# Patient Record
Sex: FEMALE | Race: BLACK OR AFRICAN AMERICAN | NOT HISPANIC OR LATINO | Employment: UNEMPLOYED | URBAN - METROPOLITAN AREA
[De-identification: names, ages, dates, MRNs, and addresses within clinical notes are randomized per-mention and may not be internally consistent; named-entity substitution may affect disease eponyms.]

---

## 2017-09-01 ENCOUNTER — OFFICE VISIT (OUTPATIENT)
Dept: OBSTETRICS AND GYNECOLOGY | Facility: CLINIC | Age: 22
End: 2017-09-01
Payer: MEDICAID

## 2017-09-01 VITALS
HEIGHT: 63 IN | SYSTOLIC BLOOD PRESSURE: 110 MMHG | WEIGHT: 149.94 LBS | DIASTOLIC BLOOD PRESSURE: 70 MMHG | BODY MASS INDEX: 26.57 KG/M2

## 2017-09-01 DIAGNOSIS — Z01.419 WELL WOMAN EXAM WITH ROUTINE GYNECOLOGICAL EXAM: Primary | ICD-10-CM

## 2017-09-01 DIAGNOSIS — Z30.41 ENCOUNTER FOR SURVEILLANCE OF CONTRACEPTIVE PILLS: ICD-10-CM

## 2017-09-01 PROCEDURE — 99212 OFFICE O/P EST SF 10 MIN: CPT | Mod: PBBFAC | Performed by: OBSTETRICS & GYNECOLOGY

## 2017-09-01 PROCEDURE — 99395 PREV VISIT EST AGE 18-39: CPT | Mod: S$PBB,,, | Performed by: OBSTETRICS & GYNECOLOGY

## 2017-09-01 PROCEDURE — 99999 PR PBB SHADOW E&M-EST. PATIENT-LVL II: CPT | Mod: PBBFAC,,, | Performed by: OBSTETRICS & GYNECOLOGY

## 2017-09-01 RX ORDER — NORETHINDRONE ACETATE AND ETHINYL ESTRADIOL 1MG-20(21)
1 KIT ORAL DAILY
Qty: 30 TABLET | Refills: 11 | Status: SHIPPED | OUTPATIENT
Start: 2017-09-01 | End: 2018-09-14 | Stop reason: SDUPTHER

## 2017-09-01 NOTE — PROGRESS NOTES
History & Physical  Gynecology      SUBJECTIVE:     Chief Complaint: Well Woman       History of Present Illness:  Annual Exam-Premenopausal  Patient presents for annual exam. The patient has no complaints today. Menstrual cycles are once a month and regular with OCP.  The patient has never been sexually active. GYN screening history: no prior history of gyn screening tests. The patient participates in regular exercise: yes.  The patient does not smoke.    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  She only occasionally drinks, does not smoke, and does not do drugs.  She feels safe in her environment.  She does not feel pressured by anyone to be sexually active.  She states that she had waxing done recently and her  said that her vaginal area was discolored.     Contraception: OCP; not sexually active     FH:  Breast cancer: grandmother, diagnosed age 59  Colon cancer: none  Ovarian cancer: none    Review of patient's allergies indicates:  No Known Allergies    History reviewed. No pertinent past medical history.  History reviewed. No pertinent surgical history.  OB History      Para Term  AB Living    0 0 0 0 0 0    SAB TAB Ectopic Multiple Live Births    0 0 0 0          Family History   Problem Relation Age of Onset    Breast cancer Maternal Grandmother     Ovarian cancer Neg Hx     Colon cancer Neg Hx      Social History   Substance Use Topics    Smoking status: Never Smoker    Smokeless tobacco: Never Used    Alcohol use No       Current Outpatient Prescriptions   Medication Sig    norethindrone-ethinyl estradiol (JUNEL FE 1/20) 1 mg-20 mcg (21)/75 mg (7) per tablet Take 1 tablet by mouth once daily.     No current facility-administered medications for this visit.          Review of Systems:  Review of Systems   Constitutional: Negative for activity change, appetite change and fever.   Respiratory: Negative for shortness of breath.    Cardiovascular: Negative for  chest pain.   Gastrointestinal: Negative for abdominal pain, constipation, diarrhea, nausea and vomiting.   Genitourinary: Negative for menorrhagia, menstrual problem, pelvic pain, vaginal bleeding, vaginal discharge and vaginal pain.   Neurological: Negative for numbness and headaches.   Breast: Negative for breast pain and nipple discharge       OBJECTIVE:     Physical Exam:  Physical Exam   Constitutional: She is oriented to person, place, and time. She appears well-developed and well-nourished.   Cardiovascular: Normal rate, regular rhythm and normal heart sounds.    Pulmonary/Chest: Effort normal and breath sounds normal. No respiratory distress. She has no wheezes. She has no rales.   Genitourinary: No labial fusion. There is no rash, tenderness, lesion or injury on the right labia. There is no rash, tenderness, lesion or injury on the left labia.   Neurological: She is oriented to person, place, and time.   Skin: No pallor.   Psychiatric: She has a normal mood and affect. Her behavior is normal. Judgment and thought content normal.   Nursing note and vitals reviewed.        ASSESSMENT:       ICD-10-CM ICD-9-CM    1. Well woman exam with routine gynecological exam Z01.419 V72.31           Plan:      [facility] was seen today for well woman.    Diagnoses and all orders for this visit:    Well woman exam with routine gynecological exam        No orders of the defined types were placed in this encounter.      Well Woman:  - Pap smear not required  - Birth control: refilled  - GC/CT:not required  - Mammogram: due age 42  - Smoking cessation: n/a  - Labs: none required   - Vaccines: HPV completed and up to date on vaccines  - discussed safe sex practices; discussed alcohol and drug usage    Return in about 1 year (around 9/1/2018) for annual or prn.    Faye Silverman

## 2018-01-21 DIAGNOSIS — Z30.41 ENCOUNTER FOR SURVEILLANCE OF CONTRACEPTIVE PILLS: ICD-10-CM

## 2018-01-22 RX ORDER — NORETHINDRONE ACETATE AND ETHINYL ESTRADIOL AND FERROUS FUMARATE 1MG-20(21)
KIT ORAL
Qty: 28 TABLET | Refills: 0 | OUTPATIENT
Start: 2018-01-22

## 2018-09-14 ENCOUNTER — OFFICE VISIT (OUTPATIENT)
Dept: OBSTETRICS AND GYNECOLOGY | Facility: CLINIC | Age: 23
End: 2018-09-14
Payer: MEDICAID

## 2018-09-14 VITALS — HEIGHT: 63 IN | BODY MASS INDEX: 26.4 KG/M2 | WEIGHT: 149 LBS

## 2018-09-14 DIAGNOSIS — Z01.419 WELL WOMAN EXAM WITH ROUTINE GYNECOLOGICAL EXAM: Primary | ICD-10-CM

## 2018-09-14 DIAGNOSIS — Z30.41 ENCOUNTER FOR SURVEILLANCE OF CONTRACEPTIVE PILLS: ICD-10-CM

## 2018-09-14 PROCEDURE — 99213 OFFICE O/P EST LOW 20 MIN: CPT | Mod: PBBFAC | Performed by: OBSTETRICS & GYNECOLOGY

## 2018-09-14 PROCEDURE — 99999 PR PBB SHADOW E&M-EST. PATIENT-LVL III: CPT | Mod: PBBFAC,,, | Performed by: OBSTETRICS & GYNECOLOGY

## 2018-09-14 PROCEDURE — 88175 CYTOPATH C/V AUTO FLUID REDO: CPT

## 2018-09-14 PROCEDURE — 99395 PREV VISIT EST AGE 18-39: CPT | Mod: S$PBB,,, | Performed by: OBSTETRICS & GYNECOLOGY

## 2018-09-14 RX ORDER — NORETHINDRONE ACETATE AND ETHINYL ESTRADIOL 1MG-20(21)
1 KIT ORAL DAILY
Qty: 84 TABLET | Refills: 4 | Status: SHIPPED | OUTPATIENT
Start: 2018-09-14 | End: 2019-08-22 | Stop reason: SDUPTHER

## 2018-12-28 ENCOUNTER — HOSPITAL ENCOUNTER (EMERGENCY)
Facility: HOSPITAL | Age: 23
Discharge: HOME | End: 2018-12-28
Attending: EMERGENCY MEDICINE
Payer: MEDICAID

## 2018-12-28 VITALS
BODY MASS INDEX: 26.4 KG/M2 | TEMPERATURE: 98 F | WEIGHT: 149 LBS | OXYGEN SATURATION: 99 % | RESPIRATION RATE: 20 BRPM | SYSTOLIC BLOOD PRESSURE: 114 MMHG | HEART RATE: 65 BPM | HEIGHT: 63 IN | DIASTOLIC BLOOD PRESSURE: 72 MMHG

## 2018-12-28 DIAGNOSIS — J40 BRONCHITIS: Primary | ICD-10-CM

## 2018-12-28 LAB
B-HCG UR QL: NEGATIVE
CTP QC/QA: YES
CTP QC/QA: YES
S PYO RRNA THROAT QL PROBE: NEGATIVE

## 2018-12-28 PROCEDURE — 87081 CULTURE SCREEN ONLY: CPT

## 2018-12-28 PROCEDURE — 81025 URINE PREGNANCY TEST: CPT | Performed by: EMERGENCY MEDICINE

## 2018-12-28 PROCEDURE — 99284 EMERGENCY DEPT VISIT MOD MDM: CPT

## 2018-12-28 RX ORDER — MONTELUKAST SODIUM 10 MG/1
10 TABLET ORAL NIGHTLY
Qty: 30 TABLET | Refills: 0 | Status: SHIPPED | OUTPATIENT
Start: 2018-12-28 | End: 2019-01-27

## 2018-12-28 RX ORDER — BENZONATATE 100 MG/1
100 CAPSULE ORAL 3 TIMES DAILY PRN
Qty: 20 CAPSULE | Refills: 0 | Status: SHIPPED | OUTPATIENT
Start: 2018-12-28 | End: 2019-01-07

## 2018-12-28 RX ORDER — HYDROCODONE POLISTIREX AND CHLORPHENIRAMINE POLISTIREX 10; 8 MG/5ML; MG/5ML
5 SUSPENSION, EXTENDED RELEASE ORAL NIGHTLY PRN
Qty: 60 ML | Refills: 0 | Status: SHIPPED | OUTPATIENT
Start: 2018-12-28 | End: 2019-08-22

## 2018-12-28 RX ORDER — LORATADINE 10 MG/1
10 TABLET ORAL DAILY
Qty: 60 TABLET | Refills: 0 | COMMUNITY
Start: 2018-12-28 | End: 2019-08-22

## 2018-12-28 RX ORDER — FLUTICASONE PROPIONATE 50 MCG
2 SPRAY, SUSPENSION (ML) NASAL DAILY
Qty: 16 G | Refills: 0 | Status: SHIPPED | OUTPATIENT
Start: 2018-12-28 | End: 2019-08-22

## 2018-12-29 NOTE — ED PROVIDER NOTES
Encounter Date: 12/28/2018    SCRIBE #1 NOTE: I, Codi Lindsay, am scribing for, and in the presence of,  Dr. Carty. I have scribed the following portions of the note - Other sections scribed: HPI, ROS, PE.       History     Chief Complaint   Patient presents with    Cough     pt reports a productive cough with clear, thick mucus x 1 1/2 weeks. Denies fever at home. Reports sore throat as well.     23 y.o female presents with a productive cough with clear mucus for 1.5 weeks. She states having a cold recently (sore throat and congestion) but hose symptoms have since resolved. She denies fever and chills, but notes rhinorrhea. No ill contacts. No hx of lung problems.       The history is provided by the patient.     Review of patient's allergies indicates:  No Known Allergies  History reviewed. No pertinent past medical history.  History reviewed. No pertinent surgical history.  Family History   Problem Relation Age of Onset    Breast cancer Maternal Grandmother 50    Ovarian cancer Neg Hx     Colon cancer Neg Hx      Social History     Tobacco Use    Smoking status: Never Smoker    Smokeless tobacco: Never Used   Substance Use Topics    Alcohol use: No    Drug use: No     Review of Systems   Constitutional: Negative for chills and fever.   HENT: Positive for rhinorrhea. Negative for congestion and sore throat.    Respiratory: Positive for cough (productive). Negative for shortness of breath.    All other systems reviewed and are negative.      Physical Exam     Initial Vitals [12/28/18 1911]   BP Pulse Resp Temp SpO2   110/60 72 16 97.7 °F (36.5 °C) 97 %      MAP       --         Physical Exam    Nursing note and vitals reviewed.  Constitutional: She appears well-developed and well-nourished. She is not diaphoretic. No distress.   HENT:   Head: Normocephalic and atraumatic.   Mouth/Throat: Uvula is midline, oropharynx is clear and moist and mucous membranes are normal. No oropharyngeal exudate, posterior  oropharyngeal edema, posterior oropharyngeal erythema or tonsillar abscesses.   Eyes: EOM are normal.   Neck: Normal range of motion.   Cardiovascular: Normal rate, regular rhythm and normal heart sounds. Exam reveals no gallop and no friction rub.    No murmur heard.  Pulmonary/Chest: Breath sounds normal. No respiratory distress. She has no wheezes. She has no rhonchi. She has no rales.   Musculoskeletal: Normal range of motion.   Neurological: She is alert and oriented to person, place, and time. No cranial nerve deficit or sensory deficit.   Skin: Skin is warm and dry. Capillary refill takes less than 2 seconds.   Psychiatric: She has a normal mood and affect. Her behavior is normal.         ED Course   Procedures  Labs Reviewed   CULTURE, STREP A,  THROAT   POCT URINE PREGNANCY   POCT RAPID STREP A          Imaging Results    None          Medical Decision Making:   Clinical Tests:   Lab Tests: Ordered            Scribe Attestation:   Scribe #1: I performed the above scribed service and the documentation accurately describes the services I performed. I attest to the accuracy of the note.      Labs Reviewed  Admission on 12/28/2018, Discharged on 12/28/2018   Component Date Value Ref Range Status    POC Preg Test, Ur 12/28/2018 Negative  Negative Final     Acceptable 12/28/2018 Yes   Final    Rapid Strep A Screen 12/28/2018 Negative  Negative Final     Acceptable 12/28/2018 Yes   Final        Imaging Reviewed    Imaging Results    None         Medications given in ED    Medications - No data to display    This document was produced by a scribe under my direction and in my presence. I agree with the content of the note and have made any necessary edits.     Ly Carty MD         Note was created using voice recognition software. Note may have occasional typographical errors that may not have been identified and edited despite good bisi initial review prior to signing.      Discharge Medications        Medication List      START taking these medications    benzonatate 100 MG capsule  Commonly known as:  TESSALON  Take 1 capsule (100 mg total) by mouth 3 (three) times daily as needed for Cough.     fluticasone 50 mcg/actuation nasal spray  Commonly known as:  FLONASE  2 sprays (100 mcg total) by Each Nare route once daily.     hydrocodone-chlorpheniramine 10-8 mg/5 mL suspension  Commonly known as:  TUSSIONEX  Take 5 mLs by mouth nightly as needed for Cough or Congestion.     loratadine 10 mg tablet  Commonly known as:  CLARITIN  Take 1 tablet (10 mg total) by mouth once daily.     montelukast 10 mg tablet  Commonly known as:  SINGULAIR  Take 1 tablet (10 mg total) by mouth every evening.        ASK your doctor about these medications    norethindrone-ethinyl estradiol 1 mg-20 mcg (21)/75 mg (7) per tablet  Commonly known as:  JUNEL FE 1/20  Take 1 tablet by mouth once daily.           Where to Get Your Medications      These medications were sent to [facility] [address] [place] [address] AT [address] [address]    Phone:  [phone]   · benzonatate 100 MG capsule  · fluticasone 50 mcg/actuation nasal spray  · montelukast 10 mg tablet     You can get these medications from any pharmacy    Bring a paper prescription for each of these medications  · hydrocodone-chlorpheniramine 10-8 mg/5 mL suspension  You don't need a prescription for these medications  · loratadine 10 mg tablet               Patient discharged to home in stable condition with instructions to:   1. Please take all meds as prescribed.  2. Follow-up with your primary care doctor   3. Return precautions discussed and patient and/or family/caretaker understands to return to the emergency room for any concerns including worsening of your current symptoms, fever, chills, night sweats, worsening pain, chest pain, shortness of breath, nausea, vomiting,  diarrhea, bleeding, headache, difficulty talking, visual disturbances, weakness, numbness or any other acute concerns             Clinical Impression:     1. Bronchitis                                 Ly Carty MD  12/29/18 8413

## 2018-12-30 LAB — BACTERIA THROAT CULT: NORMAL

## 2019-07-24 ENCOUNTER — TELEPHONE (OUTPATIENT)
Dept: OBSTETRICS AND GYNECOLOGY | Facility: CLINIC | Age: 24
End: 2019-07-24

## 2019-08-22 ENCOUNTER — OFFICE VISIT (OUTPATIENT)
Dept: OBSTETRICS AND GYNECOLOGY | Facility: CLINIC | Age: 24
End: 2019-08-22
Payer: MEDICAID

## 2019-08-22 VITALS
WEIGHT: 173.06 LBS | BODY MASS INDEX: 30.66 KG/M2 | HEIGHT: 63 IN | DIASTOLIC BLOOD PRESSURE: 58 MMHG | SYSTOLIC BLOOD PRESSURE: 100 MMHG

## 2019-08-22 DIAGNOSIS — Z01.419 WELL WOMAN EXAM WITH ROUTINE GYNECOLOGICAL EXAM: Primary | ICD-10-CM

## 2019-08-22 DIAGNOSIS — Z30.41 ENCOUNTER FOR SURVEILLANCE OF CONTRACEPTIVE PILLS: ICD-10-CM

## 2019-08-22 DIAGNOSIS — N32.89 BLADDER SPASMS: ICD-10-CM

## 2019-08-22 PROCEDURE — 99999 PR PBB SHADOW E&M-EST. PATIENT-LVL III: ICD-10-PCS | Mod: PBBFAC,,, | Performed by: OBSTETRICS & GYNECOLOGY

## 2019-08-22 PROCEDURE — 87086 URINE CULTURE/COLONY COUNT: CPT

## 2019-08-22 PROCEDURE — 99999 PR PBB SHADOW E&M-EST. PATIENT-LVL III: CPT | Mod: PBBFAC,,, | Performed by: OBSTETRICS & GYNECOLOGY

## 2019-08-22 PROCEDURE — 99395 PREV VISIT EST AGE 18-39: CPT | Mod: S$PBB,,, | Performed by: OBSTETRICS & GYNECOLOGY

## 2019-08-22 PROCEDURE — 99395 PR PREVENTIVE VISIT,EST,18-39: ICD-10-PCS | Mod: S$PBB,,, | Performed by: OBSTETRICS & GYNECOLOGY

## 2019-08-22 PROCEDURE — 99213 OFFICE O/P EST LOW 20 MIN: CPT | Mod: PBBFAC | Performed by: OBSTETRICS & GYNECOLOGY

## 2019-08-22 RX ORDER — NORETHINDRONE ACETATE AND ETHINYL ESTRADIOL 1MG-20(21)
1 KIT ORAL DAILY
Qty: 84 TABLET | Refills: 4 | Status: SHIPPED | OUTPATIENT
Start: 2019-08-22 | End: 2020-08-17 | Stop reason: SDUPTHER

## 2019-08-22 NOTE — PROGRESS NOTES
Subjective:       Patient ID: Mabel Tabor is a 24 y.o. female.    Chief Complaint:  Annual Exam      History of Present Illness:  Gynecologic Exam   The patient's pertinent negatives include no genital itching, genital lesions, genital odor, genital rash, missed menses, pelvic pain, vaginal bleeding or vaginal discharge. She is not pregnant. Associated symptoms include urgency. Pertinent negatives include no abdominal pain, anorexia, back pain, chills, constipation, diarrhea, discolored urine, dysuria, fever, flank pain, frequency, headaches, hematuria, nausea, painful intercourse, rash or vomiting. She has not been passing clots. She has not been passing tissue. She is not sexually active. No, her partner does not have an STD. She uses oral contraceptives for contraception. Her menstrual history has been regular. There is no history of an abdominal surgery, a  section, an ectopic pregnancy, endometriosis, a gynecological surgery, herpes simplex, menorrhagia, metrorrhagia, miscarriage, ovarian cysts, perineal abscess, PID, an STD, a terminated pregnancy or vaginosis.     SUBJECTIVE:   24 y.o. female  here for annual.     She describes her periods as regular, lasting 3-5 days. normal flow.  denies break through bleeding.   denies vaginal itching or irritation.  denies vaginal discharge.    She is not sexually active.  She uses oral contraceptives (estrogen/progesterone) for contraception.    History of abnormal pap: N/A  Last Pap: N/A  Last MMG: N/A  Last Colonoscopy: N/A    FH: [place] breast cancer  in late 50s. Denies other family history of breast, GYN or colon cancer.    GYN & OB History  Patient's last menstrual period was 2019.   Date of Last Pap: 2018    OB History    Para Term  AB Living   0 0 0 0 0 0   SAB TAB Ectopic Multiple Live Births   0 0 0 0         Review of Systems  Review of Systems   Constitutional: Negative for chills, diaphoresis, fatigue and fever.    Respiratory: Negative for cough and shortness of breath.    Cardiovascular: Negative for chest pain and palpitations.   Gastrointestinal: Negative for abdominal pain, anorexia, constipation, diarrhea, nausea and vomiting.   Genitourinary: Positive for bladder incontinence and urgency. Negative for dysmenorrhea, dyspareunia, dysuria, flank pain, frequency, hematuria, menorrhagia, menstrual problem, missed menses, pelvic pain, vaginal bleeding, vaginal discharge and vaginal pain.   Musculoskeletal: Negative for back pain and myalgias.   Integumentary:  Negative for rash, acne, breast mass, nipple discharge and breast skin changes.   Neurological: Negative for headaches.   Psychiatric/Behavioral: Negative for depression. The patient is not nervous/anxious.    Breast: Negative for mass, mastodynia, nipple discharge and skin changes          Objective:    Physical Exam:   Constitutional: She is oriented to person, place, and time. She appears well-developed and well-nourished. No distress.    HENT:   Head: Normocephalic and atraumatic.    Eyes: EOM are normal.    Neck: Normal range of motion. No thyromegaly present.     Pulmonary/Chest: Effort normal. She exhibits no mass and no tenderness. Right breast exhibits no inverted nipple, no mass, no nipple discharge, no skin change, no tenderness and no swelling. Left breast exhibits no inverted nipple, no mass, no nipple discharge, no skin change, no tenderness and no swelling. Breasts are symmetrical.        Abdominal: Soft. She exhibits no distension and no mass. There is no tenderness. There is no rebound and no guarding. No hernia.     Genitourinary:   Genitourinary Comments: Normal external female genitalia; vagina rugated, normal; cervix normal, no masses; uterus small mobile nontender; no adnexal masses palpated.           Musculoskeletal: Normal range of motion and moves all extremeties.       Neurological: She is alert and oriented to person, place, and time.     Skin: Skin is warm. No rash noted.    Psychiatric: She has a normal mood and affect. Her behavior is normal. Judgment and thought content normal.          Assessment:        1. Well woman exam with routine gynecological exam    2. Bladder spasms    3. Encounter for surveillance of contraceptive pills             Plan:      Mabel was seen today for well woman.    Diagnoses and all orders for this visit:    Well woman exam with routine gynecological exam  - Pap guidelines discussed. Pap up to date.   - MMG age 42.   - Contraception - OCP Rx refilled  - STD screening - declined.    -     Liquid-based pap smear, screening    Bladder spasms  - Urine dip neg. Urine culture collected.   - Recommended emptying bladder frequently.  - Discussed dietary modifications - list of bladder irritants given.   - F/U for further workup if no improvement.     Encounter for surveillance of contraceptive pills  -     norethindrone-ethinyl estradiol (JUNEL FE 1/20) 1 mg-20 mcg (21)/75 mg (7) per tablet; Take 1 tablet by mouth once daily.      Orders Placed This Encounter   Procedures    Urine culture       Follow up in about 1 year (around 8/22/2020) for annual.

## 2019-08-22 NOTE — PATIENT INSTRUCTIONS
List of Common Bladder Irritants*  Alcoholic beverages  Apples and apple juice  Cantaloupe  Carbonated beverages  Chili and spicy foods  Chocolate  Citrus fruit  Coffee (including decaffeinated)  Cranberries and cranberry juice  Grapes  Guava  Milk Products: milk, cheese, cottage cheese, yogurt, ice cream  Peaches  Pineapple  Plums  Strawberries  Sugar especially artificial sweeteners, saccharin, aspartame, corn sweeteners, honey, fructose, sucrose, lactose  Tea  Tomatoes and tomato juice  Vitamin B complex  Vinegar

## 2019-08-23 LAB
BACTERIA UR CULT: NORMAL
BACTERIA UR CULT: NORMAL

## 2019-10-06 ENCOUNTER — HOSPITAL ENCOUNTER (EMERGENCY)
Facility: HOSPITAL | Age: 24
Discharge: HOME | End: 2019-10-06
Attending: EMERGENCY MEDICINE
Payer: MEDICAID

## 2019-10-06 VITALS
BODY MASS INDEX: 30.12 KG/M2 | OXYGEN SATURATION: 98 % | TEMPERATURE: 98 F | SYSTOLIC BLOOD PRESSURE: 118 MMHG | RESPIRATION RATE: 18 BRPM | WEIGHT: 170 LBS | HEIGHT: 63 IN | HEART RATE: 80 BPM | DIASTOLIC BLOOD PRESSURE: 77 MMHG

## 2019-10-06 DIAGNOSIS — J02.9 PHARYNGITIS, UNSPECIFIED ETIOLOGY: Primary | ICD-10-CM

## 2019-10-06 LAB
B-HCG UR QL: NEGATIVE
CTP QC/QA: YES
CTP QC/QA: YES
S PYO RRNA THROAT QL PROBE: NEGATIVE

## 2019-10-06 PROCEDURE — 99284 EMERGENCY DEPT VISIT MOD MDM: CPT | Mod: 25,ER

## 2019-10-06 PROCEDURE — 81025 URINE PREGNANCY TEST: CPT | Mod: ER | Performed by: EMERGENCY MEDICINE

## 2019-10-06 PROCEDURE — 87880 STREP A ASSAY W/OPTIC: CPT | Mod: ER

## 2019-10-06 PROCEDURE — 25000003 PHARM REV CODE 250: Mod: ER | Performed by: EMERGENCY MEDICINE

## 2019-10-06 PROCEDURE — 63600175 PHARM REV CODE 636 W HCPCS: Mod: ER | Performed by: EMERGENCY MEDICINE

## 2019-10-06 PROCEDURE — 87081 CULTURE SCREEN ONLY: CPT

## 2019-10-06 RX ORDER — CETIRIZINE HYDROCHLORIDE, PSEUDOEPHEDRINE HYDROCHLORIDE 5; 120 MG/1; MG/1
1 TABLET, FILM COATED, EXTENDED RELEASE ORAL DAILY
Qty: 5 TABLET | Refills: 0 | Status: SHIPPED | OUTPATIENT
Start: 2019-10-06 | End: 2019-10-11

## 2019-10-06 RX ORDER — FLUTICASONE PROPIONATE 50 MCG
2 SPRAY, SUSPENSION (ML) NASAL DAILY
Qty: 15 G | Refills: 0 | Status: SHIPPED | OUTPATIENT
Start: 2019-10-06

## 2019-10-06 RX ORDER — PREDNISONE 20 MG/1
60 TABLET ORAL
Status: COMPLETED | OUTPATIENT
Start: 2019-10-06 | End: 2019-10-06

## 2019-10-06 RX ORDER — IBUPROFEN 600 MG/1
600 TABLET ORAL
Status: COMPLETED | OUTPATIENT
Start: 2019-10-06 | End: 2019-10-06

## 2019-10-06 RX ORDER — IBUPROFEN 600 MG/1
600 TABLET ORAL EVERY 6 HOURS PRN
Qty: 20 TABLET | Refills: 0 | Status: SHIPPED | OUTPATIENT
Start: 2019-10-06

## 2019-10-06 RX ADMIN — IBUPROFEN 600 MG: 600 TABLET, FILM COATED ORAL at 09:10

## 2019-10-06 RX ADMIN — PREDNISONE 60 MG: 20 TABLET ORAL at 09:10

## 2019-10-06 NOTE — ED PROVIDER NOTES
Encounter Date: 10/6/2019    SCRIBE #1 NOTE: I, Sohail Rider, am scribing for, and in the presence of,  Dr. Wing. I have scribed the following portions of the note - Other sections scribed: HPI, ROS, PE.       History     Chief Complaint   Patient presents with    Sore Throat     pt c/o sore throat for 2 days states painful swallowing.     24 year old female complaining of sore throat x 2 days. Pain (8/10)can be described as stingy and worsens with swallowing. Reports productive cough with white sputum and rhinorrhea with clear nasal discharge. Denies SOB, chest pain, fever, nausea, vomiting or abdominal pain. Denies relief with Tylenol. Patient has had this issue before around this time of the year last year.       The history is provided by the patient. No  was used.     Review of patient's allergies indicates:  No Known Allergies  History reviewed. No pertinent past medical history.  History reviewed. No pertinent surgical history.  Family History   Problem Relation Age of Onset    Breast cancer Maternal Grandmother 50    Ovarian cancer Neg Hx     Colon cancer Neg Hx      Social History     Tobacco Use    Smoking status: Never Smoker    Smokeless tobacco: Never Used   Substance Use Topics    Alcohol use: No    Drug use: No     Review of Systems   Constitutional: Negative for fever.   HENT: Positive for rhinorrhea and sore throat. Negative for ear discharge and ear pain.    Respiratory: Positive for cough. Negative for shortness of breath.    Cardiovascular: Negative for chest pain.   Gastrointestinal: Negative for abdominal pain, diarrhea, nausea and vomiting.   Genitourinary: Negative for difficulty urinating and dysuria.       Physical Exam     Initial Vitals [10/06/19 0902]   BP Pulse Resp Temp SpO2   118/77 80 18 98 °F (36.7 °C) 98 %      MAP       --         Physical Exam    Nursing note and vitals reviewed.  Constitutional: She appears well-developed and  well-nourished.   HENT:   Head: Normocephalic and atraumatic.   Right Ear: Tympanic membrane and external ear normal.   Left Ear: Tympanic membrane and external ear normal.   Nose: Nose normal.   Mouth/Throat: Oropharynx is clear and moist and mucous membranes are normal. No oropharyngeal exudate or posterior oropharyngeal edema.   Eyes: Conjunctivae are normal.   Neck: Neck supple.   Cardiovascular: Normal rate and regular rhythm. Exam reveals no gallop and no friction rub.    No murmur heard.  Pulmonary/Chest: Breath sounds normal. No respiratory distress. She has no wheezes. She has no rhonchi. She has no rales.   Lymphadenopathy:     She has no cervical adenopathy.   Neurological: She is alert and oriented to person, place, and time.   Skin: Skin is warm and dry.   Psychiatric: She has a normal mood and affect.         ED Course   Procedures  Labs Reviewed   CULTURE, STREP A,  THROAT   POCT URINE PREGNANCY   POCT RAPID STREP A          Imaging Results    None          Medical Decision Making:   History:   Old Medical Records: I decided to obtain old medical records.  Initial Assessment:   24 year old female with sore throat x 2 days. Reports productive cough and rhinorrhea, but denies fever. Physical exam is not significant for any pertinent findings  Clinical Tests:   Lab Tests: Ordered and Reviewed  The following lab test(s) were unremarkable: UPT  ED Management:  Will order POCT UPT and POCT rapid strep A  Will treat with predniSONE tablet 60 mg and ibuprofen 600 mg.  Will discharge patient home with ibuprofen, Zyrtec D and Flonase.  Strep was negative              Scribe Attestation:   Scribe #1: I performed the above scribed service and the documentation accurately describes the services I performed. I attest to the accuracy of the note.       I, Dr. Rachel Wing, personally performed the services described in this documentation. All medical record entries made by the scribe were at my direction and in my  presence.  I have reviewed the chart and agree that the record reflects my personal performance and is accurate and complete. Rachel Wing MD.  10:04 AM 10/06/2019             Clinical Impression:     1. Pharyngitis, unspecified etiology                                   Rachel Wing MD  10/06/19 1004

## 2019-10-08 LAB — BACTERIA THROAT CULT: NORMAL

## 2019-11-08 DIAGNOSIS — Z30.41 ENCOUNTER FOR SURVEILLANCE OF CONTRACEPTIVE PILLS: ICD-10-CM

## 2019-11-08 RX ORDER — NORETHINDRONE ACETATE AND ETHINYL ESTRADIOL AND FERROUS FUMARATE 1MG-20(21)
KIT ORAL
Qty: 84 TABLET | Refills: 0 | Status: SHIPPED | OUTPATIENT
Start: 2019-11-08 | End: 2020-01-29

## 2019-11-09 ENCOUNTER — HOSPITAL ENCOUNTER (EMERGENCY)
Facility: HOSPITAL | Age: 24
Discharge: HOME | End: 2019-11-09
Attending: EMERGENCY MEDICINE
Payer: MEDICAID

## 2019-11-09 VITALS
RESPIRATION RATE: 20 BRPM | OXYGEN SATURATION: 98 % | DIASTOLIC BLOOD PRESSURE: 56 MMHG | TEMPERATURE: 99 F | HEART RATE: 80 BPM | SYSTOLIC BLOOD PRESSURE: 112 MMHG

## 2019-11-09 DIAGNOSIS — J06.9 VIRAL URI: Primary | ICD-10-CM

## 2019-11-09 LAB
CTP QC/QA: YES
POC MOLECULAR INFLUENZA A AGN: NEGATIVE
POC MOLECULAR INFLUENZA B AGN: NEGATIVE

## 2019-11-09 PROCEDURE — 99283 PR EMERGENCY DEPT VISIT,LEVEL III: ICD-10-PCS | Mod: ,,, | Performed by: PHYSICIAN ASSISTANT

## 2019-11-09 PROCEDURE — 25000003 PHARM REV CODE 250: Performed by: PHYSICIAN ASSISTANT

## 2019-11-09 PROCEDURE — 99283 EMERGENCY DEPT VISIT LOW MDM: CPT | Mod: ,,, | Performed by: PHYSICIAN ASSISTANT

## 2019-11-09 PROCEDURE — 99283 EMERGENCY DEPT VISIT LOW MDM: CPT

## 2019-11-09 PROCEDURE — 87502 INFLUENZA DNA AMP PROBE: CPT

## 2019-11-09 RX ORDER — NAPROXEN 500 MG/1
500 TABLET ORAL
Status: COMPLETED | OUTPATIENT
Start: 2019-11-09 | End: 2019-11-09

## 2019-11-09 RX ORDER — BENZONATATE 100 MG/1
100 CAPSULE ORAL 3 TIMES DAILY PRN
Qty: 20 CAPSULE | Refills: 0 | Status: SHIPPED | OUTPATIENT
Start: 2019-11-09 | End: 2019-11-19

## 2019-11-09 RX ADMIN — NAPROXEN 500 MG: 500 TABLET ORAL at 07:11

## 2019-11-09 NOTE — ED PROVIDER NOTES
Encounter Date: 11/9/2019       History     Chief Complaint   Patient presents with    URI     +SOB on exertion    Cough     x2 days productive cough     This is a 24-year-old female with no known significant past medical history who presents to the ED with a chief complaint of cough.  Patient reports a 2 day history of upper respiratory symptoms including sore throat, nasal congestion, headache, myalgias and cough.  Patient reports posttussive shortness of breath. She reports clear to white sputum production. Patient denies fever, neck pain/stiffness, chest pain, vomiting, diarrhea, dysuria or additional complaints.  She is a nonsmoker.         Review of patient's allergies indicates:  No Known Allergies  No past medical history on file.  No past surgical history on file.  Family History   Problem Relation Age of Onset    Breast cancer Maternal Grandmother 50    Ovarian cancer Neg Hx     Colon cancer Neg Hx      Social History     Tobacco Use    Smoking status: Never Smoker    Smokeless tobacco: Never Used   Substance Use Topics    Alcohol use: No    Drug use: No     Review of Systems   Constitutional: Negative for appetite change and fever.   HENT: Positive for congestion and sore throat. Negative for voice change.    Respiratory: Positive for cough and shortness of breath.    Cardiovascular: Negative for chest pain.   Gastrointestinal: Negative for nausea and vomiting.   Genitourinary: Negative for dysuria.   Musculoskeletal: Positive for myalgias. Negative for back pain.   Skin: Negative for rash.   Neurological: Positive for headaches. Negative for weakness.   Hematological: Does not bruise/bleed easily.       Physical Exam     Initial Vitals [11/09/19 0619]   BP Pulse Resp Temp SpO2   (!) 121/55 84 16 98.6 °F (37 °C) 98 %      MAP       --         Physical Exam    Constitutional: She appears well-developed and well-nourished. No distress.   HENT:   Head: Atraumatic.   Right Ear: Tympanic membrane is  injected. Tympanic membrane is not bulging.   Left Ear: Tympanic membrane is not injected, not erythematous and not bulging.   Mouth/Throat: Uvula is midline and oropharynx is clear and moist. No oropharyngeal exudate.   Eyes: Conjunctivae and EOM are normal. Pupils are equal, round, and reactive to light.   Cardiovascular: Normal rate, regular rhythm, normal heart sounds and intact distal pulses.   Pulmonary/Chest: Breath sounds normal. No respiratory distress. She has no decreased breath sounds. She has no wheezes. She has no rhonchi. She has no rales.   Abdominal: Soft. Bowel sounds are normal. There is no tenderness. There is no rebound and no guarding.   Neurological: She is alert and oriented to person, place, and time.   Skin: Skin is warm and dry. No rash noted.         ED Course   Procedures  Labs Reviewed   POCT INFLUENZA A/B MOLECULAR - Normal          Imaging Results    None          Medical Decision Making:   Clinical Tests:   Lab Tests: Ordered and Reviewed       APC / Resident Notes:   24-year-old female presenting with a 2 day history of URI symptoms.  Exam with mild injection of the right TM, congested nasal mucosa.  Lungs are clear.  Vitals are stable.    DDx includes but is not limited to influenza, viral syndrome, bronchitis, pneumonia.  I considered but do not suspect PE, she is PERC negative.    Influenza screen is negative.  Patient given naproxen the ED with symptomatic improvement.  Discussed supportive care.  She will be discharged with a prescription for Tessalon Perles.  Return to ED precautions discussed.  She is stable for discharge.                             Clinical Impression:       ICD-10-CM ICD-9-CM   1. Viral URI J06.9 465.9         Disposition:   Disposition: Discharged  Condition: Stable                     Beti Miller PA-C  11/09/19 1369

## 2019-11-09 NOTE — DISCHARGE INSTRUCTIONS
You are seen in the emergency department today for a viral cold.    This is best treated with over-the-counter medications used for symptom relief, such as ibuprofen or Tylenol for pain and headache, Mucinex for congestion and cough.    You are prescribed Tessalon to treat your symptom of cough.  Take as directed.    Drink plenty of fluids and get plenty of rest.    Return to the ED for fever over 101, cough with dark green mucus, shortness of breath, chest pain or any new concerning symptoms.

## 2019-11-09 NOTE — ED TRIAGE NOTES
"Pt presents to the ed with c/o productive cough and congestion. Pt states, "I think I have a URI. I have a really hard time breathing." Pt currently eating chips and breathing equal and non-labored.   "

## 2019-11-09 NOTE — ED NOTES
LOC: The patient is awake, alert, and oriented to place, time, situation. Affect is appropriate.  Speech is appropriate and clear.     APPEARANCE: Patient resting comfortably in no acute distress.  Patient is clean and well groomed.    SKIN: The skin is warm and dry; color consistent with ethnicity.  Patient has normal skin turgor and moist mucus membranes.  Skin intact; no breakdown or bruising noted.     MUSCULOSKELETAL: Patient moving upper and lower extremities without difficulty.  Denies weakness.     RESPIRATORY: Airway is open and patent. Respirations spontaneous, even, easy, and non-labored.  Patient has a normal effort and rate.  No accessory muscle use noted. C/o cough and congestion, breath sounds clear.     CARDIAC:  Normal rhythm and rate noted.  No peripheral edema noted. No complaints of chest pain.      ABDOMEN: Soft and non tender to palpation.  No distention noted.     NEUROLOGIC: Eyes open spontaneously.  Behavior appropriate to situation.  Follows commands; facial expression symmetrical.  Purposeful motor response noted; normal sensation in all extremities.

## 2019-12-14 ENCOUNTER — HOSPITAL ENCOUNTER (EMERGENCY)
Facility: HOSPITAL | Age: 24
Discharge: HOME | End: 2019-12-14
Attending: EMERGENCY MEDICINE
Payer: MEDICAID

## 2019-12-14 VITALS
SYSTOLIC BLOOD PRESSURE: 123 MMHG | BODY MASS INDEX: 30.12 KG/M2 | WEIGHT: 170 LBS | HEART RATE: 88 BPM | OXYGEN SATURATION: 99 % | RESPIRATION RATE: 18 BRPM | TEMPERATURE: 99 F | DIASTOLIC BLOOD PRESSURE: 56 MMHG | HEIGHT: 63 IN

## 2019-12-14 DIAGNOSIS — J98.4 PNEUMONITIS: Primary | ICD-10-CM

## 2019-12-14 DIAGNOSIS — R05.9 COUGH: ICD-10-CM

## 2019-12-14 LAB
B-HCG UR QL: NEGATIVE
CTP QC/QA: YES

## 2019-12-14 PROCEDURE — 25000242 PHARM REV CODE 250 ALT 637 W/ HCPCS: Performed by: STUDENT IN AN ORGANIZED HEALTH CARE EDUCATION/TRAINING PROGRAM

## 2019-12-14 PROCEDURE — 99284 EMERGENCY DEPT VISIT MOD MDM: CPT | Mod: ,,, | Performed by: EMERGENCY MEDICINE

## 2019-12-14 PROCEDURE — 99284 EMERGENCY DEPT VISIT MOD MDM: CPT | Mod: 25

## 2019-12-14 PROCEDURE — 81025 URINE PREGNANCY TEST: CPT | Performed by: STUDENT IN AN ORGANIZED HEALTH CARE EDUCATION/TRAINING PROGRAM

## 2019-12-14 PROCEDURE — 99284 PR EMERGENCY DEPT VISIT,LEVEL IV: ICD-10-PCS | Mod: ,,, | Performed by: EMERGENCY MEDICINE

## 2019-12-14 PROCEDURE — 25000003 PHARM REV CODE 250: Performed by: STUDENT IN AN ORGANIZED HEALTH CARE EDUCATION/TRAINING PROGRAM

## 2019-12-14 RX ORDER — ALBUTEROL SULFATE 90 UG/1
2 AEROSOL, METERED RESPIRATORY (INHALATION) EVERY 6 HOURS PRN
Status: DISCONTINUED | OUTPATIENT
Start: 2019-12-14 | End: 2019-12-14 | Stop reason: HOSPADM

## 2019-12-14 RX ORDER — BENZONATATE 100 MG/1
100 CAPSULE ORAL 3 TIMES DAILY PRN
Status: DISCONTINUED | OUTPATIENT
Start: 2019-12-14 | End: 2019-12-14 | Stop reason: HOSPADM

## 2019-12-14 RX ORDER — BENZONATATE 100 MG/1
100 CAPSULE ORAL 3 TIMES DAILY PRN
Qty: 20 CAPSULE | Refills: 0 | Status: SHIPPED | OUTPATIENT
Start: 2019-12-14 | End: 2019-12-24

## 2019-12-14 RX ORDER — PREDNISONE 20 MG/1
40 TABLET ORAL DAILY
Qty: 10 TABLET | Refills: 0 | Status: SHIPPED | OUTPATIENT
Start: 2019-12-14 | End: 2019-12-19

## 2019-12-14 RX ADMIN — BENZONATATE 100 MG: 100 CAPSULE ORAL at 12:12

## 2019-12-14 RX ADMIN — ALBUTEROL SULFATE 2 PUFF: 90 AEROSOL, METERED RESPIRATORY (INHALATION) at 12:12

## 2019-12-14 NOTE — ED TRIAGE NOTES
Patient reports to the ED today with reports of Pleurisy. Patient states that she was recently dx with an respiratory infection but states that she isn't feeling any better. Patient states she gets a burning sensation when she coughs

## 2019-12-14 NOTE — ED PROVIDER NOTES
"Encounter Date: 12/14/2019       History     Chief Complaint   Patient presents with    Pleurisy     states "my chest feels inflamed" coughing, burning pain with inspiration     HPI     25 yo F denies pmhx presenting with cough and congestion for about 4 weeks. Says she has had URI like Sx intermittently since September, has had complete resolution between episodes until approximately 3-4 weeks ago. Was seen at this ED and diagnosed with URI. Since then, she says her cough has persisted with intermittent shortness of breath and chest "feeling inflamed". She has some congestion as well with runny nose. Denies fever or chills, headache, nausea/vomiting. Endorses some decreased appetite. Has tried dayquil and ibuprofen with some improvement in symptoms.   Also reports concern for exposure to mold/dust in her home which she's been living in since 2012, and also feels her work environment is very fawad which seems to aggravate her cough.     Review of patient's allergies indicates:  No Known Allergies  History reviewed. No pertinent past medical history.  History reviewed. No pertinent surgical history.  Family History   Problem Relation Age of Onset    Breast cancer Maternal Grandmother 50    Ovarian cancer Neg Hx     Colon cancer Neg Hx      Social History     Tobacco Use    Smoking status: Never Smoker    Smokeless tobacco: Never Used   Substance Use Topics    Alcohol use: No    Drug use: No     Review of Systems   Constitutional: Positive for appetite change. Negative for chills and fever.   HENT: Positive for congestion and rhinorrhea. Negative for ear pain, facial swelling, sinus pressure, sinus pain, sneezing, sore throat and trouble swallowing.    Eyes: Negative for visual disturbance.   Respiratory: Positive for cough and shortness of breath. Negative for wheezing. Chest tightness: "inflamed"    Cardiovascular: Negative for palpitations and leg swelling.   Gastrointestinal: Negative for abdominal pain, " nausea and vomiting.   Genitourinary: Negative for dysuria and hematuria.   Musculoskeletal: Negative for myalgias, neck pain and neck stiffness.   Skin: Negative for rash.   Allergic/Immunologic: Positive for environmental allergies (possible).   Neurological: Negative for numbness and headaches.   Psychiatric/Behavioral: Negative for confusion.   All other systems reviewed and are negative.      Physical Exam     Initial Vitals [12/14/19 1055]   BP Pulse Resp Temp SpO2   (!) 123/56 84 15 99 °F (37.2 °C) 99 %      MAP       --         Physical Exam    Nursing note and vitals reviewed.  Constitutional: She appears well-developed and well-nourished. She is not diaphoretic. No distress.   HENT:   Head: Normocephalic and atraumatic.   Nose: Nose normal.   Mouth/Throat: Oropharynx is clear and moist. No oropharyngeal exudate.   No sinus tenderness   Eyes: Conjunctivae and EOM are normal. Pupils are equal, round, and reactive to light.   Neck: Normal range of motion. Neck supple. No tracheal deviation present.   Cardiovascular: Normal rate, regular rhythm, normal heart sounds and intact distal pulses.   Pulmonary/Chest: Breath sounds normal. No respiratory distress. She has no wheezes. She has no rhonchi.   Intermittent cough on exam, dry    Abdominal: She exhibits no distension.   Musculoskeletal: Normal range of motion. She exhibits no edema or tenderness.   Lymphadenopathy:     She has no cervical adenopathy.   Neurological: She is alert and oriented to person, place, and time. GCS score is 15. GCS eye subscore is 4. GCS verbal subscore is 5. GCS motor subscore is 6.   Skin: Skin is warm and dry. Capillary refill takes less than 2 seconds.   Psychiatric: She has a normal mood and affect.         ED Course   Procedures  Labs Reviewed   POCT URINE PREGNANCY          Imaging Results    None          Medical Decision Making:   Initial Assessment:   25 yo F presenting with cough and congestion x 3-4 weeks, some associated  "SOB and chest "inflamed" with coughing. Ddx includes persistent or consecutive URIs, Reactive airway disease/asthma, pneumonia, bronchitis or pneumonitis.   As cough has been ongoing and she reports some productive of mucous, will obtain CXR (has not yet had one). Will provide tessalon and albuterol puffs and reassess.   Ivett Driscoll DO [facility]  12:16 PM      Updates:  CXR with pneumonitis- infectious vs inflammatory to L.   Patient sounds and feels improved with albuterol and tessalon.   Using shared decision making, discussed steroid burst for inflammatory process vs antibiotics for infectious process. Elected to pursue steroid burst and recommend continued use of her albuterol inhaler prn. Also providing Rx for tessalon prn. Patient advised to contact primary care clinic with info provided in paperwork to establish care and for continued follow up if her symptoms persist, especially if she feels she has triggers to environmental exposures and may need a referral for allergy specialists in future. Also discussed strict return precautions. She is amenable to this plan and questions answered.  1:15 PM                  Attending Attestation:   Physician Attestation Statement for Resident:  As the supervising MD   Physician Attestation Statement: I have personally seen and examined this patient.   I agree with the above history. -:   As the supervising MD I agree with the above PE.    As the supervising MD I agree with the above treatment, course, plan, and disposition.                                  Clinical Impression:       ICD-10-CM ICD-9-CM   1. Pneumonitis J18.9 486   2. Cough R05 786.2                             Ivett Driscoll DO  Resident  12/14/19 1319       Kaylan Lucero MD  12/14/19 1352    "

## 2019-12-14 NOTE — ED NOTES
Patient identifiers verified and correct for Syndey Sharona  LOC: The patient is awake, alert and aware of environment with an appropriate affect, the patient is oriented x 3 and speaking appropriately.   APPEARANCE: Patient appears comfortable and in no acute distress, patient is clean and well groomed.  SKIN: The skin is warm and dry, color consistent with ethnicity, patient has normal skin turgor and moist mucus membranes, skin intact, no breakdown or bruising noted.   MUSCULOSKELETAL: Patient moving all extremities spontaneously, no swelling noted.  RESPIRATORY: Airway is open and patent, respirations are spontaneous, patient has a normal effort and rate, no accessory muscle use noted, O2 sats noted at 99% on room air. Reports of cough and nasal congestion   CARDIAC:Burning sensation in chest with coughing capillary refill < 3 seconds.   GASTRO: Soft and non tender to palpation, no distention noted, normoactive bowel sounds present in all four quadrants. Pt states bowel movements have been regular.  : Pt denies any pain or frequency with urination.  NEURO: Pt opens eyes spontaneously, behavior appropriate to situation, follows commands, facial expression symmetrical, bilateral hand grasp equal and even, purposeful motor response noted, normal sensation in all extremities when touched with a finger.

## 2020-01-29 DIAGNOSIS — Z30.41 ENCOUNTER FOR SURVEILLANCE OF CONTRACEPTIVE PILLS: ICD-10-CM

## 2020-01-29 RX ORDER — NORETHINDRONE ACETATE AND ETHINYL ESTRADIOL AND FERROUS FUMARATE 1MG-20(21)
KIT ORAL
Qty: 84 TABLET | Refills: 3 | Status: SHIPPED | OUTPATIENT
Start: 2020-01-29

## 2020-08-13 ENCOUNTER — TELEPHONE (OUTPATIENT)
Dept: OBSTETRICS AND GYNECOLOGY | Facility: CLINIC | Age: 25
End: 2020-08-13

## 2020-08-13 NOTE — TELEPHONE ENCOUNTER
----- Message from Jayna Haney sent at 8/13/2020  9:31 AM CDT -----  Contact: self  Patient is calling for her annual well women exam (she has medicaid)  so please call back to get her scheduled at [phone] (home).  Thanks

## 2020-08-17 ENCOUNTER — OFFICE VISIT (OUTPATIENT)
Dept: OBSTETRICS AND GYNECOLOGY | Facility: CLINIC | Age: 25
End: 2020-08-17
Payer: MEDICAID

## 2020-08-17 VITALS
HEIGHT: 63 IN | SYSTOLIC BLOOD PRESSURE: 132 MMHG | DIASTOLIC BLOOD PRESSURE: 78 MMHG | BODY MASS INDEX: 32.77 KG/M2 | WEIGHT: 184.94 LBS

## 2020-08-17 DIAGNOSIS — Z01.419 WELL WOMAN EXAM WITH ROUTINE GYNECOLOGICAL EXAM: Primary | ICD-10-CM

## 2020-08-17 DIAGNOSIS — Z30.41 ENCOUNTER FOR SURVEILLANCE OF CONTRACEPTIVE PILLS: ICD-10-CM

## 2020-08-17 PROCEDURE — 99213 OFFICE O/P EST LOW 20 MIN: CPT | Mod: PBBFAC | Performed by: OBSTETRICS & GYNECOLOGY

## 2020-08-17 PROCEDURE — 99395 PREV VISIT EST AGE 18-39: CPT | Mod: S$PBB,,, | Performed by: OBSTETRICS & GYNECOLOGY

## 2020-08-17 PROCEDURE — 99999 PR PBB SHADOW E&M-EST. PATIENT-LVL III: ICD-10-PCS | Mod: PBBFAC,,, | Performed by: OBSTETRICS & GYNECOLOGY

## 2020-08-17 PROCEDURE — 99395 PR PREVENTIVE VISIT,EST,18-39: ICD-10-PCS | Mod: S$PBB,,, | Performed by: OBSTETRICS & GYNECOLOGY

## 2020-08-17 PROCEDURE — 99999 PR PBB SHADOW E&M-EST. PATIENT-LVL III: CPT | Mod: PBBFAC,,, | Performed by: OBSTETRICS & GYNECOLOGY

## 2020-08-17 RX ORDER — NORETHINDRONE ACETATE AND ETHINYL ESTRADIOL 1MG-20(21)
1 KIT ORAL DAILY
Qty: 84 TABLET | Refills: 4 | Status: SHIPPED | OUTPATIENT
Start: 2020-08-17 | End: 2021-08-17

## 2020-08-17 RX ORDER — PAROXETINE HYDROCHLORIDE 20 MG/1
TABLET, FILM COATED ORAL
COMMUNITY
Start: 2020-07-25

## 2020-08-17 NOTE — PROGRESS NOTES
Subjective:       Patient ID: Mabel Tabor is a 25 y.o. female.    Chief Complaint:  Well Woman      History of Present Illness:  Gynecologic Exam  The patient's pertinent negatives include no genital itching, genital lesions, genital odor, genital rash, missed menses, pelvic pain, vaginal bleeding or vaginal discharge. She is not pregnant. Pertinent negatives include no abdominal pain, anorexia, back pain, chills, constipation, diarrhea, discolored urine, dysuria, fever, flank pain, frequency, headaches, hematuria, nausea, painful intercourse, rash, urgency or vomiting. She has not been passing clots. She has not been passing tissue. She is not sexually active. No, her partner does not have an STD. She uses oral contraceptives for contraception. Her menstrual history has been regular. There is no history of an abdominal surgery, a  section, an ectopic pregnancy, endometriosis, a gynecological surgery, herpes simplex, menorrhagia, metrorrhagia, miscarriage, ovarian cysts, perineal abscess, PID, an STD, a terminated pregnancy or vaginosis.     SUBJECTIVE:   25 y.o. female  here for annual.     She describes her periods as regular, lasting 3-5 days. normal flow.  denies break through bleeding.   denies vaginal itching or irritation.  denies vaginal discharge.    She is not sexually active.  She uses oral contraceptives (estrogen/progesterone) for contraception.    History of abnormal pap: No  Last Pap: 2018 - NILM  Last MMG: N/A  Last Colonoscopy: N/A    FH: [facility] breast cancer  in late 50s. Denies other family history of breast, GYN or colon cancer.    GYN & OB History  Patient's last menstrual period was 08/10/2020.   Date of Last Pap: 2018    OB History    Para Term  AB Living   0 0 0 0 0 0   SAB TAB Ectopic Multiple Live Births   0 0 0 0         Review of Systems  Review of Systems   Constitutional: Negative for chills, diaphoresis, fatigue and fever.   Respiratory:  Negative for cough and shortness of breath.    Cardiovascular: Negative for chest pain and palpitations.   Gastrointestinal: Negative for abdominal pain, anorexia, constipation, diarrhea, nausea and vomiting.   Genitourinary: Negative for bladder incontinence, dysmenorrhea, dyspareunia, dysuria, flank pain, frequency, hematuria, menorrhagia, menstrual problem, missed menses, pelvic pain, urgency, vaginal bleeding, vaginal discharge and vaginal pain.   Musculoskeletal: Negative for back pain and myalgias.   Integumentary:  Negative for rash, acne, breast mass, nipple discharge and breast skin changes.   Neurological: Negative for headaches.   Psychiatric/Behavioral: Negative for depression. The patient is not nervous/anxious.    Breast: Negative for mass, mastodynia, nipple discharge and skin changes          Objective:    Physical Exam:   Constitutional: She is oriented to person, place, and time. She appears well-developed and well-nourished. No distress.    HENT:   Head: Normocephalic and atraumatic.    Eyes: EOM are normal.    Neck: Normal range of motion. No thyromegaly present.     Pulmonary/Chest: Effort normal. She exhibits no mass and no tenderness. Right breast exhibits no inverted nipple, no mass, no nipple discharge, no skin change, no tenderness and no swelling. Left breast exhibits no inverted nipple, no mass, no nipple discharge, no skin change, no tenderness and no swelling. Breasts are symmetrical.        Abdominal: Soft. She exhibits no distension and no mass. There is no abdominal tenderness. There is no rebound and no guarding. No hernia.     Genitourinary:    Genitourinary Comments: Normal external female genitalia; vagina rugated, normal; cervix normal, no masses; uterus small mobile nontender; no adnexal masses palpated.             Musculoskeletal: Normal range of motion and moves all extremeties.       Neurological: She is alert and oriented to person, place, and time.    Skin: Skin is warm.  No rash noted.    Psychiatric: She has a normal mood and affect. Her behavior is normal. Judgment and thought content normal.          Assessment:        1. Well woman exam with routine gynecological exam    2. Encounter for surveillance of contraceptive pills             Plan:      Mabel was seen today for well woman.    Diagnoses and all orders for this visit:    Well woman exam with routine gynecological exam  - Pap guidelines discussed. Pap up to date.   - MMG age 42.   - Contraception - OCP Rx refilled  - STD screening - declined.    -     Liquid-based pap smear, screening    Encounter for surveillance of contraceptive pills  -     norethindrone-ethinyl estradiol (JUNEL FE 1/20) 1 mg-20 mcg (21)/75 mg (7) per tablet; Take 1 tablet by mouth once daily.      No orders of the defined types were placed in this encounter.      Follow up in about 1 year (around 8/17/2021) for annual.

## 2021-01-19 ENCOUNTER — PATIENT MESSAGE (OUTPATIENT)
Dept: OBSTETRICS AND GYNECOLOGY | Facility: CLINIC | Age: 26
End: 2021-01-19

## 2021-04-15 ENCOUNTER — PATIENT MESSAGE (OUTPATIENT)
Dept: RESEARCH | Facility: HOSPITAL | Age: 26
End: 2021-04-15

## 2021-08-04 NOTE — PROGRESS NOTES
Subjective:       Patient ID: Mabel Tabor is a 23 y.o. female.    Chief Complaint:  Well Woman      History of Present Illness:  HPI  SUBJECTIVE:   23 y.o. female  here for annual.     She describes her periods as regular, lasting 3-5 days. normal flow.  denies break through bleeding.   denies vaginal itching or irritation.  denies vaginal discharge.    She is not sexually active.  She uses oral contraceptives (estrogen/progesterone) for contraception.    History of abnormal pap: N/A  Last Pap: N/A  Last MMG: N/A  Last Colonoscopy: N/A    FH: [facility] breast cancer  in late 50s. Denies other family history of breast, GYN or colon cancer.    GYN & OB History  Patient's last menstrual period was 2018.   Date of Last Pap: 2018    OB History    Para Term  AB Living   0 0 0 0 0 0   SAB TAB Ectopic Multiple Live Births   0 0 0 0               Review of Systems  Review of Systems   Constitutional: Negative for chills, diaphoresis, fatigue and fever.   Respiratory: Negative for cough and shortness of breath.    Cardiovascular: Negative for chest pain and palpitations.   Gastrointestinal: Negative for abdominal pain, constipation, diarrhea, nausea and vomiting.   Genitourinary: Negative for dyspareunia, pelvic pain, vaginal bleeding, vaginal discharge and vaginal pain.   Neurological: Negative for headaches.   Psychiatric/Behavioral: Negative for depression.   Breast: Negative for breast mass, breast pain, nipple discharge and skin changes          Objective:    Physical Exam:   Constitutional: She is oriented to person, place, and time. She appears well-developed and well-nourished. No distress.    HENT:   Head: Normocephalic and atraumatic.    Eyes: EOM are normal.    Neck: Normal range of motion.     Pulmonary/Chest: Effort normal. She exhibits no mass and no tenderness. Right breast exhibits no inverted nipple, no mass, no nipple discharge, no skin change, no tenderness and no swelling.  Left breast exhibits no inverted nipple, no mass, no nipple discharge, no skin change, no tenderness and no swelling. Breasts are symmetrical.        Abdominal: Soft. She exhibits no distension. There is no tenderness.     Genitourinary:   Genitourinary Comments: Normal external female genitalia; vagina rugated, normal; cervix normal, no masses; uterus small mobile nontender; no adnexal masses palpated.           Musculoskeletal: Normal range of motion and moves all extremeties.       Neurological: She is alert and oriented to person, place, and time.    Skin: Skin is warm.    Psychiatric: She has a normal mood and affect. Her behavior is normal. Judgment and thought content normal.          Assessment:        1. Well woman exam with routine gynecological exam    2. Encounter for surveillance of contraceptive pills             Plan:      Mabel was seen today for well woman.    Diagnoses and all orders for this visit:    Well woman exam with routine gynecological exam  - Pap guidelines discussed. Pap collected.   - MMG age 42.   - Contraception - OCP Rx  - STD screening - declined.    -     Liquid-based pap smear, screening    Encounter for surveillance of contraceptive pills  -     norethindrone-ethinyl estradiol (JUNEL FE 1/20) 1 mg-20 mcg (21)/75 mg (7) per tablet; Take 1 tablet by mouth once daily.      No orders of the defined types were placed in this encounter.      Follow-up in about 1 year (around 9/14/2019) for annual.        Add 52 Modifier (Optional): no Detail Level: Detailed Consent: The patient's consent was obtained including but not limited to risks of crusting, scabbing, blistering, scarring, darker or lighter pigmentary change, recurrence, incomplete removal and infection. Show Aperture Variable?: Yes Medical Necessity Clause: This procedure was medically necessary because the lesions that were treated were: Medical Necessity Information: It is in your best interest to select a reason for this procedure from the list below. All of these items fulfill various CMS LCD requirements except the new and changing color options. Post-Care Instructions: I reviewed with the patient in detail post-care instructions. Patient is to wear sunprotection, and avoid picking at any of the treated lesions. Pt may apply Vaseline to crusted or scabbing areas.